# Patient Record
Sex: FEMALE | Race: BLACK OR AFRICAN AMERICAN
[De-identification: names, ages, dates, MRNs, and addresses within clinical notes are randomized per-mention and may not be internally consistent; named-entity substitution may affect disease eponyms.]

---

## 2018-02-14 ENCOUNTER — HOSPITAL ENCOUNTER (OUTPATIENT)
Dept: HOSPITAL 62 - SP | Age: 63
End: 2018-02-14
Attending: INTERNAL MEDICINE
Payer: MEDICARE

## 2018-02-14 DIAGNOSIS — I80.11: Primary | ICD-10-CM

## 2018-02-14 PROCEDURE — 93971 EXTREMITY STUDY: CPT

## 2018-02-14 NOTE — XCELERA REPORT
[address] [address]

                             Tel: [phone]

                             Fax: [phone]



                     Lower Extremity Venous Evaluation

____________________________________________________________________________



Name: NAYAN RAMIREZ

MRN: [id number]                Age: 62 yrs

Gender: Female                 : 1955

Patient Status: Outpatient     Patient Location: 

Account #: [id number]

Study Date: 2018 01:04 PM

Accession #: [id number]

____________________________________________________________________________



Procedure: Color flow and duplex imaging of the veins of the right lower

extremity as well as the left Common Femoral vein.

Reason For Study: PHLEBITIS





Ordering Physician: SANCHEZ ANDREW

Performed By: Joie Ibanez

____________________________________________________________________________



____________________________________________________________________________





Right Sided Venous Evaluation

Normal vessel filling wall to wall, compression and augmentation as well as

Colour flow down to the infrageniculate veins.



Left Sided Venous Evaluation

The left common femoral vein is fully compressible. Spontaneous and phasic

flow is present in the left common femoral vein.

____________________________________________________________________________





Interpretation Summary

No duplex evidence of DVT or obstruction in the right lower extremity nor

in the left Common Femoral vein.

____________________________________________________________________________



____________________________________________________________________________



Electronically signed by:      Lennox Williams      on 2018 05:04 PM



CC: SANCHEZ ANDREW

>

Williams, Lennox

## 2018-03-30 ENCOUNTER — HOSPITAL ENCOUNTER (EMERGENCY)
Dept: HOSPITAL 62 - ER | Age: 63
Discharge: HOME | End: 2018-03-30
Payer: MEDICARE

## 2018-03-30 VITALS — SYSTOLIC BLOOD PRESSURE: 129 MMHG | DIASTOLIC BLOOD PRESSURE: 73 MMHG

## 2018-03-30 DIAGNOSIS — R07.9: Primary | ICD-10-CM

## 2018-03-30 DIAGNOSIS — R11.0: ICD-10-CM

## 2018-03-30 DIAGNOSIS — E11.9: ICD-10-CM

## 2018-03-30 DIAGNOSIS — I10: ICD-10-CM

## 2018-03-30 DIAGNOSIS — R10.13: ICD-10-CM

## 2018-03-30 DIAGNOSIS — I25.2: ICD-10-CM

## 2018-03-30 DIAGNOSIS — K21.9: ICD-10-CM

## 2018-03-30 DIAGNOSIS — F17.200: ICD-10-CM

## 2018-03-30 LAB
ADD MANUAL DIFF: NO
ALBUMIN SERPL-MCNC: 4.2 G/DL (ref 3.5–5)
ALP SERPL-CCNC: 123 U/L (ref 38–126)
ALT SERPL-CCNC: 31 U/L (ref 9–52)
ANION GAP SERPL CALC-SCNC: 8 MMOL/L (ref 5–19)
AST SERPL-CCNC: 15 U/L (ref 14–36)
BASOPHILS # BLD AUTO: 0.1 10^3/UL (ref 0–0.2)
BASOPHILS NFR BLD AUTO: 1 % (ref 0–2)
BILIRUB DIRECT SERPL-MCNC: 0.3 MG/DL (ref 0–0.4)
BILIRUB SERPL-MCNC: 0.5 MG/DL (ref 0.2–1.3)
BUN SERPL-MCNC: 16 MG/DL (ref 7–20)
CALCIUM: 10 MG/DL (ref 8.4–10.2)
CHLORIDE SERPL-SCNC: 106 MMOL/L (ref 98–107)
CK MB SERPL-MCNC: 0.43 NG/ML (ref ?–4.55)
CK SERPL-CCNC: 71 U/L (ref 30–135)
CO2 SERPL-SCNC: 30 MMOL/L (ref 22–30)
EOSINOPHIL # BLD AUTO: 0.3 10^3/UL (ref 0–0.6)
EOSINOPHIL NFR BLD AUTO: 3.5 % (ref 0–6)
ERYTHROCYTE [DISTWIDTH] IN BLOOD BY AUTOMATED COUNT: 13.1 % (ref 11.5–14)
GLUCOSE SERPL-MCNC: 207 MG/DL (ref 75–110)
HCT VFR BLD CALC: 43.5 % (ref 36–47)
HGB BLD-MCNC: 14.9 G/DL (ref 12–15.5)
LYMPHOCYTES # BLD AUTO: 3.8 10^3/UL (ref 0.5–4.7)
LYMPHOCYTES NFR BLD AUTO: 38.9 % (ref 13–45)
MCH RBC QN AUTO: 30.5 PG (ref 27–33.4)
MCHC RBC AUTO-ENTMCNC: 34.2 G/DL (ref 32–36)
MCV RBC AUTO: 89 FL (ref 80–97)
MONOCYTES # BLD AUTO: 0.6 10^3/UL (ref 0.1–1.4)
MONOCYTES NFR BLD AUTO: 5.8 % (ref 3–13)
NEUTROPHILS # BLD AUTO: 5 10^3/UL (ref 1.7–8.2)
NEUTS SEG NFR BLD AUTO: 50.8 % (ref 42–78)
PLATELET # BLD: 238 10^3/UL (ref 150–450)
POTASSIUM SERPL-SCNC: 3.5 MMOL/L (ref 3.6–5)
PROT SERPL-MCNC: 7.1 G/DL (ref 6.3–8.2)
RBC # BLD AUTO: 4.87 10^6/UL (ref 3.72–5.28)
SODIUM SERPL-SCNC: 143.8 MMOL/L (ref 137–145)
TOTAL CELLS COUNTED % (AUTO): 100 %
TROPONIN I SERPL-MCNC: < 0.012 NG/ML
WBC # BLD AUTO: 9.8 10^3/UL (ref 4–10.5)

## 2018-03-30 PROCEDURE — 71045 X-RAY EXAM CHEST 1 VIEW: CPT

## 2018-03-30 PROCEDURE — 82553 CREATINE MB FRACTION: CPT

## 2018-03-30 PROCEDURE — 82550 ASSAY OF CK (CPK): CPT

## 2018-03-30 PROCEDURE — 93005 ELECTROCARDIOGRAM TRACING: CPT

## 2018-03-30 PROCEDURE — 36415 COLL VENOUS BLD VENIPUNCTURE: CPT

## 2018-03-30 PROCEDURE — 84484 ASSAY OF TROPONIN QUANT: CPT

## 2018-03-30 PROCEDURE — 99285 EMERGENCY DEPT VISIT HI MDM: CPT

## 2018-03-30 PROCEDURE — 93010 ELECTROCARDIOGRAM REPORT: CPT

## 2018-03-30 PROCEDURE — 85025 COMPLETE CBC W/AUTO DIFF WBC: CPT

## 2018-03-30 PROCEDURE — 80053 COMPREHEN METABOLIC PANEL: CPT

## 2018-03-30 NOTE — RADIOLOGY REPORT (SQ)
EXAM DESCRIPTION:

CHEST SINGLE VIEW



CLINICAL HISTORY:

63 years Female, chest pain



COMPARISON:

None.



NUMBER OF VIEWS/TECHNIQUE:

1/AP 



LIMITATIONS:  None.



FINDINGS:



Normal lung volume, clear parenchyma, normal cardiac silhouette,

and intact bony thorax. Left axillary and left lower hemithoracic

clips.



IMPRESSION:



No acute cardiopulmonary findings.

## 2018-03-30 NOTE — EKG REPORT
SEVERITY:- ABNORMAL ECG -

SINUS RHYTHM

NONSPECIFIC T ABNORMALITIES, LATERAL LEADS

:

Confirmed by: Ruddy Flynn MD 30-Mar-2018 07:39:02

## 2018-03-30 NOTE — ER DOCUMENT REPORT
ED General





- General


Chief Complaint: Chest Pain


Stated Complaint: CHEST PAIN


Time Seen by Provider: 03/30/18 02:08


Notes: 





Patient is a 63 year old female who presents to the ED complaining of chest 

pain. She states this started around 1130pm after eating chik-ward-a for dinner 

she describes its location as right substernal with epigastric nausea lasting 

less then 10 minutes. Did not take anything PTA. Denies associated SOB, BALDERAS, 

orthopnea, cough, dizzyness, syncope. 





PMH: HLD, HTN, DM, chronic sinusitis, PVD





Previous stress test > 5 y/a for cardiac testing prior to breast surgery. 

Denies h/o MI, DVT, PE. 


TRAVEL OUTSIDE OF THE [place] IN LAST 30 DAYS: No





- Related Data


Allergies/Adverse Reactions: 


 





No Known Allergies Allergy (Unverified 04/29/13 11:51)


 











Past Medical History





- Social History


Smoking Status: Current Every Day Smoker


Family History: Reviewed & Not Pertinent





- Past Medical History


Cardiac Medical History: Reports: Hx Heart Attack - SIGNS OF MI AT [place], 

BALA SAID NO MI, Hx Hypertension


Pulmonary Medical History: 


   Denies: Hx Asthma


Neurological Medical History: Denies: Hx Cerebrovascular Accident, Hx Seizures


GI Medical History: Denies: Hx Hepatitis, Hx Hiatal Hernia, Hx Ulcer


Infectious Medical History: Denies: Hx Hepatitis


Past Surgical History: Reports: Hx Mastectomy - NO IVS B/P.  Denies: Hx Open 

Heart Surgery, Hx Pacemaker





Review of Systems





- Review of Systems


Constitutional: No symptoms reported.  denies: Chills, Diaphoresis, Fever, 

Weakness, Weight gain


Cardiovascular: Chest pain.  denies: Palpitations, Heart racing, Orthopnea, 

Dyspnea, Dizziness, Lightheaded, Edema


Respiratory: denies: Hurts to breathe, Hemoptysis, Short of breath, Wheezing


Gastrointestinal: See HPI, Abdominal pain, Nausea.  denies: Vomiting


Musculoskeletal: No symptoms reported


-: Yes All other systems reviewed and negative





Physical Exam





- Vital signs


Vitals: 


 











Temp Pulse Resp BP Pulse Ox


 


 97.9 F   82   18   140/78 H  95 


 


 03/30/18 01:48  03/30/18 01:48  03/30/18 01:48  03/30/18 01:48  03/30/18 01:48














- Notes


Notes: 





PHYSICAL EXAM


GENERAL: Alert, interacts well. 


HEAD: Normocephalic, atraumatic.


EYES: Pupils equal, round, and reactive to light. Extraocular movements intact.


ENT: Oral mucosa moist, tongue midline. 


NECK: Full range of motion. Supple. Trachea midline.


LUNGS: Clear to auscultation bilaterally, no wheezes, rales, or rhonchi. No 

respiratory distress.


HEART: Chest wall nontender.  regular rate and rhythm. No murmurs, gallops, or 

rubs.


ABDOMEN: Soft,  nondistended,epigastric mildly tender with pain reproducible to 

palpation. No guarding, rebound, or rigidity.. Bowel sounds present in all 4 

quadrants.


EXTREMITIES: Moves all 4 extremities spontaneously. No edema, radial and 

dorsalis pedis pulses 2/4 bilaterally. No cyanosis. 


NEUROLOGICAL: Alert and oriented x4. Normal speech.


PSYCH: Normal affect, normal mood.


SKIN: Warm, dry, normal turgor. No rashes or lesions noted.








Course





- Re-evaluation


Re-evalutation: 





03/30/18 03:36


Patient is a 63 year old female presents with epigastric discomfort and right 

sided sharp chest pain that has resolved. Patient is very well in appearance, 

vitals within normal limits.  Low clinical suspicion for ACS given clinical 

history, exam, EKG without ST elevations or depressions, and negative initial 

troponin. HEART score less than or equal to 3.  PE also seems unlikely given 

clinical history, absence of tachycardia or dyspnea. Well's score of 0. CXR 

without evidence of pneumothorax or pneumonia. No widened mediastinum. Aortic 

dissection also seems unlikely given history, symmetric pulses, CXR, and 

vitals. GERD considerable given complete resolution of epigastric tenderness 

after GI cocktail. Patient declining to stay for repeat troponin. Requesting negar de la cruz and will follow up with Dr Andrew. At this time will discharge with 

return precautions and follow-up recommendations.  Verbal discharge 

instructions given a the bedside and opportunity for questions given. 

Medication warnings reviewed. Patient is in agreement with this plan and has 

verbalized understanding of return precautions and the need for primary care 

follow-up in the next 24-72 hours.








- Vital Signs


Vital signs: 


 











Temp Pulse Resp BP Pulse Ox


 


 97.9 F   82   18   140/78 H  95 


 


 03/30/18 01:48  03/30/18 01:48  03/30/18 01:48  03/30/18 01:48  03/30/18 01:48














- Laboratory


Result Diagrams: 


 03/30/18 02:25





 03/30/18 02:25


Laboratory results interpreted by me: 


 











  03/30/18





  02:25


 


Potassium  3.5 L


 


Glucose  207 H














- Diagnostic Test


Radiology reviewed: Image reviewed, Reports reviewed





- EKG Interpretation by Me


EKG shows normal: Sinus rhythm


Rate: Normal


Rhythm: NSR


When compared to previous EKG there are: No significant change





Discharge





- Discharge


Clinical Impression: 


Chest pain


Qualifiers:


 Chest pain type: unspecified Qualified Code(s): R07.9 - Chest pain, unspecified





GERD (gastroesophageal reflux disease)


Qualifiers:


 Esophagitis presence: esophagitis presence not specified Qualified Code(s): 

K21.9 - Gastro-esophageal reflux disease without esophagitis





Condition: Good


Disposition: HOME, SELF-CARE


Instructions:  Reflux Disease (GERD) (Formerly [facility])


Additional Instructions: 


You were seen today for chest pain.  The exact cause of your pain is unclear. 

However, based on your cardiac enzyme testing, chest x-ray, and EKG it does not 

appear that it is from an immediately life-threatening cause at this time.  

Although your testing here is normal is critical that you follow-up with your 

primary care physician for continued evaluation of this chest pain and possible 

stress testing.  I recommended you see your physician within the next 24-48 

hours to be evaluated for consideration of a stress test.  Please return to 

emergency department immediately if you have worsening of your chest pain, 

shortness of breath, vomiting, become unable to exert yourself due to pain or 

difficulty breathing, you pass out, or have any pain that radiates into your 

arms, jaw, or back. Please also return if you have any additional symptoms that 

are concerning to you.


Prescriptions: 


Omeprazole Magnesium [Prilosec Otc] 20 mg PO DAILY #30 tablet.dr


Forms:  Elevated Blood Pressure


Referrals: 


SANCHEZ ANDREW MD [Primary Care Provider] - Follow up in 3-5 days

## 2018-08-20 ENCOUNTER — HOSPITAL ENCOUNTER (OUTPATIENT)
Dept: HOSPITAL 62 - RAD | Age: 63
End: 2018-08-20
Attending: INTERNAL MEDICINE
Payer: MEDICARE

## 2018-08-20 DIAGNOSIS — R10.30: Primary | ICD-10-CM

## 2018-08-20 PROCEDURE — 76856 US EXAM PELVIC COMPLETE: CPT

## 2018-08-20 NOTE — RADIOLOGY REPORT (SQ)
EXAM DESCRIPTION:  U/S NON-OB PELVIS W/O DOP



COMPLETED DATE/TIME:  8/20/2018 8:43 am



REASON FOR STUDY:  LOWER ABD PAIN (R10.30) R10.30  LOWER ABDOMINAL PAIN, UNSPECIFIED



COMPARISON:  None.



TECHNIQUE:  Dynamic and static grayscale images acquired of the pelvis via transabdominal approach an
d recorded on PACS. Additional selected color Doppler and spectral images recorded.



LIMITATIONS:  None.



FINDINGS:  UTERUS: Contour normal.  No mass.

ENDOMETRIAL STRIPE: No focal or generalized thickening. No masses.

CERVIX: No nabothian cysts.

RIGHT OVARY AND DOPPLER: Normal size. No worrisome masses. Normal arterial vascular flow without evid
ence for torsion.

FREE FLUID: None noted.

OTHER: No other significant finding.

MEASUREMENTS:

UTERUS:  6.0 cm x 3.3 cm.

ENDOMETRIAL STRIPE:  4.6 mm

RIGHT OVARY: 2 x 1.7 x 1.2 cm

LEFT OVARY:  Not visualized



IMPRESSION:  NORMAL PELVIC ULTRASOUND BY TRANSABDOMINAL TECHNIQUE.



TECHNICAL DOCUMENTATION:  JOB ID:  [id number]

 2011 [employer]- All Rights Reserved                           Rev-5/18



Reading location - IP/workstation name: [email or web address]

## 2019-02-22 ENCOUNTER — HOSPITAL ENCOUNTER (OUTPATIENT)
Dept: HOSPITAL 62 - RAD | Age: 64
End: 2019-02-22
Attending: INTERNAL MEDICINE
Payer: MEDICARE

## 2019-02-22 DIAGNOSIS — Z87.891: Primary | ICD-10-CM

## 2019-02-22 PROCEDURE — G0297 LDCT FOR LUNG CA SCREEN: HCPCS

## 2019-02-22 NOTE — RADIOLOGY REPORT (SQ)
EXAM DESCRIPTION:  CT LUNG CANCER SCREENING



COMPLETED DATE/TIME:  2/22/2019 2:01 pm



REASON FOR STUDY:  NICOTINE DEPENDENCE (Z87.891) F17.200  NICOTINE DEPENDENCE, UNSPECIFIED, UNCOMPLIC
ATED Z87.891  PERSONAL HISTORY OF NICOTINE DEPENDENCE

Has the patient had a Chest CT scan within the past year? No

Was the patient offered tobacco cessation counseling? Yes

Was the patient engaged in shared decision making for this test? Yes

Does the patient have signs or symptoms of Lung Cancer? No

Is the patient a smoker? Yes

How many pack years? 50

How many years since quitting smoking? Not applicable

Patients age: 63



COMPARISON:  AP chest 3/30/2018



TECHNIQUE:  Low Dose CT scan performed of the chest without intravenous contrast for purposes of scre
ening for lung cancer.  Images reviewed with lung, soft tissue and bone windows.  Reconstructed coron
al and sagittal MPR images reviewed.  All images stored on PACS.

All CT scanners at this facility use dose modulation, iterative reconstruction, and/or weight based d
osing when appropriate to reduce radiation dose to as low as reasonably achievable (ALARA).

[facility]: Dose Right  [facility]: CareDose    [facility]: Dose Right    [facility]: Teradose 4D    [facility]: Smart Good Seed



RADIATION DOSE:  CT Rad equipment meets quality standard of care and radiation dose reduction techniq
ues were employed. CTDIvol: 2.1 mGy. DLP: 75 mGy-cm.  mGy.

 .



LIMITATIONS:  None



FINDINGS:  LUNGS AND PLEURA: No masses or nodules.    No pleural effusions or calcifications.  No pne
umothorax.   No scarring or interstitial changes.

HILAR AND MEDIASTINAL STRUCTURES: No identified masses. No abnormal nodes.

HEART AND VASCULAR STRUCTURES: No aortic aneurysm.  No pericardial effusion.   No cardiac devices.

CORONARY ARTERY CALCIFICATIONS: No significant calcifications.

UPPER ABDOMEN, THYROID, BONES, OTHER SOFT TISSUES: Post left breast reconstructed tram flap



IMPRESSION:  NO SIGNIFICANT FINDING IN THE LUNGS ON NON-CONTRASTED CHEST CT.

NO OTHER CLINICALLY SIGNIFICANT/POTENTIALLY CLINICALLY SIGNIFICANT FINDINGS



LUNGRADS:  LUNGRADS: 1 NEGATIVE.  NO NODULES, OR DEFINITELY BENIGN NODULES

MODIFIER: NONE



RECOMMENDATION:  Continue annual screening with LDCT in 12 months.



COMMENT:  CRITERIA:

No lung nodules.

Nodules with specific calcifications:  Complete, central, popcorn, concentric rings and fat containin
g nodules.



TECHNICAL DOCUMENTATION:  JOB ID:  [id number]

Quality ID # [id number]: Final reports with documentation of one or more dose reduction techniques (e.g., Au
tomated exposure control, adjustment of the mA and/or kV according to patient size, use of iterative 
reconstruction technique)

 2011 [facility]



Reading location - IP/workstation name: [email or web address] [email or web address]

## 2019-09-24 ENCOUNTER — HOSPITAL ENCOUNTER (OUTPATIENT)
Dept: HOSPITAL 62 - OD | Age: 64
End: 2019-09-24
Attending: INTERNAL MEDICINE
Payer: MEDICARE

## 2019-09-24 DIAGNOSIS — J20.9: Primary | ICD-10-CM

## 2019-09-24 PROCEDURE — 71046 X-RAY EXAM CHEST 2 VIEWS: CPT

## 2019-09-24 NOTE — RADIOLOGY REPORT (SQ)
EXAM DESCRIPTION:  CHEST PA/LATERAL



COMPLETED DATE/TIME:  9/24/2019 11:26 am



REASON FOR STUDY:  ACUTE BRONCHITIS



COMPARISON:  PA and lateral views of the chest from 6/28/2011.



EXAM PARAMETERS:  NUMBER OF VIEWS: two views

TECHNIQUE: Digital Frontal and Lateral radiographic views of the chest acquired.

RADIATION DOSE: NA

LIMITATIONS: none



FINDINGS:  The cardiomediastinal silhouette and pulmonary vasculature are within normal limits.  Ther
e is no consolidation, pleural effusion or pneumothorax.

There are surgical clips in the left breast.  There is no acute abnormality of the imaged osseous str
uctures.



IMPRESSION:  No acute cardiopulmonary process.



TECHNICAL DOCUMENTATION:  JOB ID:  [id number]

 2011 [employer]- All Rights Reserved



Reading location - IP/workstation name: [email or web address]

## 2019-10-22 ENCOUNTER — HOSPITAL ENCOUNTER (OUTPATIENT)
Dept: HOSPITAL 62 - WI | Age: 64
End: 2019-10-22
Attending: INTERNAL MEDICINE
Payer: MEDICARE

## 2019-10-22 DIAGNOSIS — Z12.31: Primary | ICD-10-CM

## 2019-10-22 PROCEDURE — 77067 SCR MAMMO BI INCL CAD: CPT

## 2019-11-18 ENCOUNTER — HOSPITAL ENCOUNTER (OUTPATIENT)
Dept: HOSPITAL 62 - OD | Age: 64
End: 2019-11-18
Attending: INTERNAL MEDICINE
Payer: MEDICARE

## 2019-11-18 DIAGNOSIS — M25.511: Primary | ICD-10-CM

## 2019-11-18 NOTE — RADIOLOGY REPORT (SQ)
EXAM DESCRIPTION:  SHOULDER RIGHT 2 OR MORE VIEWS



COMPLETED DATE/TIME:  11/18/2019 3:03 pm



REASON FOR STUDY:  PAIN IN RT SHOULDER M25.511  PAIN IN RIGHT SHOULDER



COMPARISON:  CT CHEST LUNG CANCER SCREENING 2/22/2019



NUMBER OF VIEWS:  Three views.



TECHNIQUE:  Internal rotation, external rotation, and Y view images acquired of the right shoulder.



LIMITATIONS:  None.



FINDINGS:  MINERALIZATION: Osteopenic

BONES: No acute fracture.  No worrisome bone lesions.

JOINTS: No glenohumeral dislocation.  No acromioclavicular joint widening.  Mild AC joint bony spurri
ng.

VISUALIZED LUNGS AND RIBS: No pneumothorax.  No rib fracture.

SOFT TISSUES: No radiopaque foreign body.

OTHER: No other significant finding.



IMPRESSION:  NO RADIOGRAPHIC EVIDENCE OF ACUTE INJURY.



TECHNICAL DOCUMENTATION:  JOB ID:  [id number]

 2011 [employer]- All Rights Reserved



Reading location - IP/workstation name: [email or web address]

## 2019-12-10 ENCOUNTER — HOSPITAL ENCOUNTER (OUTPATIENT)
Dept: HOSPITAL 62 - RAD | Age: 64
End: 2019-12-10
Attending: INTERNAL MEDICINE
Payer: MEDICARE

## 2019-12-10 DIAGNOSIS — R07.9: Primary | ICD-10-CM

## 2019-12-10 DIAGNOSIS — M25.511: ICD-10-CM

## 2019-12-10 DIAGNOSIS — M50.322: ICD-10-CM

## 2019-12-10 DIAGNOSIS — M19.011: ICD-10-CM

## 2019-12-10 PROCEDURE — A9576 INJ PROHANCE MULTIPACK: HCPCS

## 2019-12-10 PROCEDURE — 82565 ASSAY OF CREATININE: CPT

## 2019-12-10 PROCEDURE — 71552 MRI CHEST W/O & W/DYE: CPT

## 2019-12-11 NOTE — RADIOLOGY REPORT (SQ)
EXAM DESCRIPTION:  MRI CHEST COMBO



COMPLETED DATE/TIME:  12/10/2019 8:43 am



REASON FOR STUDY:  CHEST PAIN (R07.9) R07.9  CHEST PAIN, UNSPECIFIED



COMPARISON:  None.



TECHNIQUE:  PROCEDURE: T1 pre and post gadolinium, T2 fat sat weight sequences with attention to the 
 brachial plexus, large field of view.



CONTRAST TYPE AND DOSE:  15 mL Dotarem.



RENAL FUNCTION:  GFR > 60.



LIMITATIONS:  None.



FINDINGS:  Patient has pain in the right neck shoulder.  Area of maximal pain was marked with vitamin
-E capsules on the patient's skin.  Deep to the vitamin-E capsule, there is no subcutaneous or deep t
issue mass.  No abnormal signal in upper posterior ribs or paraspinal muscles.

No abnormal masses or enhancement along the course of the right brachials plexus from the cervical sp
ine through the axilla.

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
ing and synovial fluid.

There is evidence of degenerative change at the right shoulder, incompletely included in the field of
 view on this brachials plexus exam.  There is fluid in the subacromial/subdeltoid bursa, AC joint hy
pertrophy, subcortical cyst formation in the anterior right humeral head deep to the subscapularis mu
scle.  Internal derangement of the shoulder may be present.  Dedicated MRI of the shoulders recommend
ed.

Lower cervical and upper thoracic spine demonstrates degenerative disc changes in the lower cervical 
spine at new C4-5, C5-6, C6-7.  Bulky bony spurring and disc bulge and facet hypertrophy at C5-6.  Ri
ght shoulder pain could be referred from cervical nerve root impingement, consider dedicated MRI of t
he cervical spine



IMPRESSION:  NO EVIDENCE OF MASS OR ADENOPATHY ALONG THE BRACHIALS PLEXUS OR IN THE AREA OF MAXIMAL P
AIN RIGHT SHOULDER, INDICATED BY THE PATIENT.

THERE IS DEGENERATIVE CHANGE WITH INTERNAL DERANGEMENT OF THE RIGHT SHOULDER, AND DEGENERATIVE DISC D
ISEASE MOST PRONOUNCED AT C5-6, INCOMPLETELY EVALUATED ON TODAY'S STUDY



TECHNICAL DOCUMENTATION:  JOB ID:  [id number]

 2011 [employer]- All Rights Reserved



Reading location - IP/workstation name: [email or web address]